# Patient Record
Sex: MALE | Race: WHITE | HISPANIC OR LATINO | Employment: FULL TIME | URBAN - METROPOLITAN AREA
[De-identification: names, ages, dates, MRNs, and addresses within clinical notes are randomized per-mention and may not be internally consistent; named-entity substitution may affect disease eponyms.]

---

## 2018-04-16 ENCOUNTER — HISTORICAL (OUTPATIENT)
Dept: RADIOLOGY | Facility: HOSPITAL | Age: 38
End: 2018-04-16

## 2018-04-16 LAB — POC CREATININE: 0.8 MG/DL (ref 0.6–1.3)

## 2018-04-18 ENCOUNTER — HISTORICAL (OUTPATIENT)
Dept: INTENSIVE CARE | Facility: HOSPITAL | Age: 38
End: 2018-04-18

## 2018-07-20 ENCOUNTER — HISTORICAL (OUTPATIENT)
Dept: ADMINISTRATIVE | Facility: HOSPITAL | Age: 38
End: 2018-07-20

## 2018-07-20 LAB
ABS NEUT (OLG): 6.96 X10(3)/MCL (ref 2.1–9.2)
ALBUMIN SERPL-MCNC: 4.2 GM/DL (ref 3.4–5)
ALBUMIN/GLOB SERPL: 1.1 {RATIO}
ALP SERPL-CCNC: 155 UNIT/L (ref 50–136)
ALT SERPL-CCNC: 36 UNIT/L (ref 12–78)
AST SERPL-CCNC: 29 UNIT/L (ref 15–37)
BASOPHILS # BLD AUTO: 0 X10(3)/MCL (ref 0–0.2)
BASOPHILS NFR BLD AUTO: 0 %
BILIRUB SERPL-MCNC: 0.4 MG/DL (ref 0.2–1)
BILIRUBIN DIRECT+TOT PNL SERPL-MCNC: 0.1 MG/DL (ref 0–0.2)
BILIRUBIN DIRECT+TOT PNL SERPL-MCNC: 0.3 MG/DL (ref 0–0.8)
BUN SERPL-MCNC: 10 MG/DL (ref 7–18)
CALCIUM SERPL-MCNC: 9.6 MG/DL (ref 8.5–10.1)
CHLORIDE SERPL-SCNC: 102 MMOL/L (ref 98–107)
CO2 SERPL-SCNC: 25 MMOL/L (ref 21–32)
CREAT SERPL-MCNC: 0.87 MG/DL (ref 0.7–1.3)
EOSINOPHIL # BLD AUTO: 0.1 X10(3)/MCL (ref 0–0.9)
EOSINOPHIL NFR BLD AUTO: 1 %
ERYTHROCYTE [DISTWIDTH] IN BLOOD BY AUTOMATED COUNT: 12.7 % (ref 11.5–17)
GLOBULIN SER-MCNC: 3.9 GM/DL (ref 2.4–3.5)
GLUCOSE SERPL-MCNC: 101 MG/DL (ref 74–106)
HCT VFR BLD AUTO: 43.7 % (ref 42–52)
HGB BLD-MCNC: 14.9 GM/DL (ref 14–18)
LYMPHOCYTES # BLD AUTO: 2.2 X10(3)/MCL (ref 0.6–4.6)
LYMPHOCYTES NFR BLD AUTO: 21 %
MCH RBC QN AUTO: 30.1 PG (ref 27–31)
MCHC RBC AUTO-ENTMCNC: 34.1 GM/DL (ref 33–36)
MCV RBC AUTO: 88.3 FL (ref 80–94)
MONOCYTES # BLD AUTO: 0.9 X10(3)/MCL (ref 0.1–1.3)
MONOCYTES NFR BLD AUTO: 8 %
NEUTROPHILS # BLD AUTO: 6.96 X10(3)/MCL (ref 2.1–9.2)
NEUTROPHILS NFR BLD AUTO: 68 %
PHENYTOIN SERPL-MCNC: 11.5 MCG/ML (ref 10–20)
PLATELET # BLD AUTO: 306 X10(3)/MCL (ref 130–400)
PMV BLD AUTO: 9.2 FL (ref 9.4–12.4)
POTASSIUM SERPL-SCNC: 4.1 MMOL/L (ref 3.5–5.1)
PROT SERPL-MCNC: 8.1 GM/DL (ref 6.4–8.2)
RBC # BLD AUTO: 4.95 X10(6)/MCL (ref 4.7–6.1)
SODIUM SERPL-SCNC: 138 MMOL/L (ref 136–145)
WBC # SPEC AUTO: 10.2 X10(3)/MCL (ref 4.5–11.5)

## 2018-11-13 ENCOUNTER — HISTORICAL (OUTPATIENT)
Dept: INTENSIVE CARE | Facility: HOSPITAL | Age: 38
End: 2018-11-13

## 2022-04-11 ENCOUNTER — HISTORICAL (OUTPATIENT)
Dept: ADMINISTRATIVE | Facility: HOSPITAL | Age: 42
End: 2022-04-11
Payer: COMMERCIAL

## 2022-04-24 VITALS
BODY MASS INDEX: 28 KG/M2 | WEIGHT: 164 LBS | HEIGHT: 64 IN | SYSTOLIC BLOOD PRESSURE: 138 MMHG | DIASTOLIC BLOOD PRESSURE: 92 MMHG

## 2022-09-13 RX ORDER — LAMOTRIGINE 200 MG/1
TABLET ORAL
COMMUNITY
Start: 2021-05-31 | End: 2022-10-17 | Stop reason: SDUPTHER

## 2022-09-13 RX ORDER — PHENYTOIN SODIUM 100 MG/1
CAPSULE, EXTENDED RELEASE ORAL
COMMUNITY
Start: 2022-02-09 | End: 2022-10-17 | Stop reason: SDUPTHER

## 2022-09-13 RX ORDER — ZONISAMIDE 100 MG/1
CAPSULE ORAL
COMMUNITY
Start: 2022-02-09 | End: 2022-10-17 | Stop reason: SDUPTHER

## 2022-10-17 ENCOUNTER — OFFICE VISIT (OUTPATIENT)
Dept: NEUROLOGY | Facility: CLINIC | Age: 42
End: 2022-10-17
Payer: COMMERCIAL

## 2022-10-17 VITALS
HEIGHT: 64 IN | SYSTOLIC BLOOD PRESSURE: 128 MMHG | DIASTOLIC BLOOD PRESSURE: 84 MMHG | BODY MASS INDEX: 29.02 KG/M2 | WEIGHT: 170 LBS

## 2022-10-17 DIAGNOSIS — G40.909 NONINTRACTABLE EPILEPSY WITHOUT STATUS EPILEPTICUS, UNSPECIFIED EPILEPSY TYPE: Primary | ICD-10-CM

## 2022-10-17 PROCEDURE — 3079F DIAST BP 80-89 MM HG: CPT | Mod: CPTII,S$GLB,, | Performed by: NURSE PRACTITIONER

## 2022-10-17 PROCEDURE — 1159F MED LIST DOCD IN RCRD: CPT | Mod: CPTII,S$GLB,, | Performed by: NURSE PRACTITIONER

## 2022-10-17 PROCEDURE — 99213 PR OFFICE/OUTPT VISIT, EST, LEVL III, 20-29 MIN: ICD-10-PCS | Mod: S$GLB,,, | Performed by: NURSE PRACTITIONER

## 2022-10-17 PROCEDURE — 4010F ACE/ARB THERAPY RXD/TAKEN: CPT | Mod: CPTII,S$GLB,, | Performed by: NURSE PRACTITIONER

## 2022-10-17 PROCEDURE — 99999 PR PBB SHADOW E&M-EST. PATIENT-LVL III: ICD-10-PCS | Mod: PBBFAC,,, | Performed by: NURSE PRACTITIONER

## 2022-10-17 PROCEDURE — 99999 PR PBB SHADOW E&M-EST. PATIENT-LVL III: CPT | Mod: PBBFAC,,, | Performed by: NURSE PRACTITIONER

## 2022-10-17 PROCEDURE — 1159F PR MEDICATION LIST DOCUMENTED IN MEDICAL RECORD: ICD-10-PCS | Mod: CPTII,S$GLB,, | Performed by: NURSE PRACTITIONER

## 2022-10-17 PROCEDURE — 4010F PR ACE/ARB THEARPY RXD/TAKEN: ICD-10-PCS | Mod: CPTII,S$GLB,, | Performed by: NURSE PRACTITIONER

## 2022-10-17 PROCEDURE — 3074F SYST BP LT 130 MM HG: CPT | Mod: CPTII,S$GLB,, | Performed by: NURSE PRACTITIONER

## 2022-10-17 PROCEDURE — 99213 OFFICE O/P EST LOW 20 MIN: CPT | Mod: S$GLB,,, | Performed by: NURSE PRACTITIONER

## 2022-10-17 PROCEDURE — 3079F PR MOST RECENT DIASTOLIC BLOOD PRESSURE 80-89 MM HG: ICD-10-PCS | Mod: CPTII,S$GLB,, | Performed by: NURSE PRACTITIONER

## 2022-10-17 PROCEDURE — 3074F PR MOST RECENT SYSTOLIC BLOOD PRESSURE < 130 MM HG: ICD-10-PCS | Mod: CPTII,S$GLB,, | Performed by: NURSE PRACTITIONER

## 2022-10-17 RX ORDER — LOSARTAN POTASSIUM 100 MG/1
100 TABLET ORAL DAILY
COMMUNITY
Start: 2022-09-13

## 2022-10-17 RX ORDER — AMLODIPINE BESYLATE 5 MG/1
5 TABLET ORAL DAILY
COMMUNITY
Start: 2022-09-15

## 2022-10-17 RX ORDER — LAMOTRIGINE 200 MG/1
400 TABLET ORAL 2 TIMES DAILY
Qty: 360 TABLET | Refills: 3 | Status: SHIPPED | OUTPATIENT
Start: 2022-10-17 | End: 2023-12-06 | Stop reason: SDUPTHER

## 2022-10-17 RX ORDER — ZONISAMIDE 100 MG/1
200 CAPSULE ORAL NIGHTLY
Qty: 180 CAPSULE | Refills: 3 | Status: SHIPPED | OUTPATIENT
Start: 2022-10-17 | End: 2022-11-29

## 2022-10-17 RX ORDER — PHENYTOIN SODIUM 100 MG/1
200 CAPSULE, EXTENDED RELEASE ORAL 2 TIMES DAILY
Qty: 360 CAPSULE | Refills: 3 | Status: SHIPPED | OUTPATIENT
Start: 2022-10-17 | End: 2023-11-02 | Stop reason: SDUPTHER

## 2022-10-17 NOTE — PROGRESS NOTES
"SEIZURE FOLLOW UP    SUBJECTIVE:  Patient ID: Kenny Lou   41 y.o.  Chief Complaint: Seizures (1 year f/u for seizures. Patient reports no seizure activity since last visit. States his last seizure was 4-5 years ago appx.)      History of Present Illness:     Presents today for seizure follow up. Denies seizure- like activity since last visit. Last known seizure was in 2019    Continues to take lamotrigine 400mg BID, phenytoin 200mg BID, and zonisamide 200mg qhs; tolerating meds w/o diff.      Continues to having ringing in ears aura. No seizures following    Sleeps well at night.      Review of Systems - as per HPI, otherwise a balanced 10 systems review is negative.    PFSH: Past medical, family, and social history reviewed as documented in chart with pertinent positive medical, family, and social history detailed in HPI.    Current Medications:    Current Outpatient Medications:     amLODIPine (NORVASC) 5 MG tablet, Take 5 mg by mouth once daily., Disp: , Rfl:     losartan (COZAAR) 100 MG tablet, Take 100 mg by mouth once daily., Disp: , Rfl:     lamoTRIgine (LAMICTAL) 200 MG tablet, Take 2 tablets (400 mg total) by mouth 2 (two) times daily., Disp: 360 tablet, Rfl: 3    phenytoin (DILANTIN) 100 MG ER capsule, Take 2 capsules (200 mg total) by mouth 2 (two) times daily., Disp: 360 capsule, Rfl: 3    zonisamide (ZONEGRAN) 100 MG Cap, Take 2 capsules (200 mg total) by mouth every evening., Disp: 180 capsule, Rfl: 3      OBJECTIVE:  Vitals:  /84 (BP Location: Left arm, Patient Position: Sitting)   Ht 5' 4" (1.626 m)   Wt 77.1 kg (170 lb)   BMI 29.18 kg/m²     Physical Exam:  Constitutional: he appears well-developed and well-nourished. he is well groomed   Head: Normocephalic and atraumatic  Musculoskeletal: Normal range of motion.   Skin: Skin is warm and dry.  Psychiatric: Normal mood and affect.     Neuro exam:    The patient is alert and oriented   Normal attention and concentration  Speech " is normal   Pupils are equal and reactive to light.    Visual fields are full to confrontation testing.   CN 8 - hearing is grossly normal  Motor - grossly normal  Gait - unassisted; posture upright. gait is steady with normal steps    Review of Data:   Notes reviewed   Labs:  No visits with results within 3 Month(s) from this visit.   Latest known visit with results is:   Historical on 07/20/2018   Component Date Value Ref Range Status    Albumin/Globulin Ratio 07/20/2018 1.1   Final    Alanine Aminotransferase 07/20/2018 36  12 - 78 unit/L Final    Albumin Level 07/20/2018 4.20  3.40 - 5.00 gm/dL Final    Aspartate Aminotransferase 07/20/2018 29  15 - 37 unit/L Final    Alkaline Phosphatase 07/20/2018 155 (H)  50 - 136 unit/L Final    Bilirubin Total 07/20/2018 0.4  0.2 - 1.0 mg/dL Final    Bilirubin Direct 07/20/2018 0.10  0.00 - 0.20 mg/dL Final    Bilirubin Indirect 07/20/2018 0.30  0.00 - 0.80 mg/dL Final    Blood Urea Nitrogen 07/20/2018 10.0  7.0 - 18.0 mg/dL Final    Chloride 07/20/2018 102  98 - 107 mmol/L Final    Calcium Level Total 07/20/2018 9.6  8.5 - 10.1 mg/dL Final    Carbon Dioxide 07/20/2018 25.0  21.0 - 32.0 mmol/L Final    Creatinine 07/20/2018 0.87  0.70 - 1.30 mg/dL Final    Globulin 07/20/2018 3.90 (H)  2.40 - 3.50 gm/dL Final    Glucose Level 07/20/2018 101  74 - 106 mg/dL Final    Potassium Level 07/20/2018 4.1  3.5 - 5.1 mmol/L Final    Sodium Level 07/20/2018 138  136 - 145 mmol/L Final    Protein Total 07/20/2018 8.1  6.4 - 8.2 gm/dL Final    Estimated GFR- 07/20/2018 >60  mL/min/1.73 m2 Final    Estimated GFR-Non  07/20/2018 >60  mL/min/1.73 m2 Final    Abs Neut 07/20/2018 6.96  2.10 - 9.20 x10(3)/mcL Final    MCH 07/20/2018 30.1  27.0 - 31.0 pg Final    Hct 07/20/2018 43.7  42.0 - 52.0 % Final    MCHC 07/20/2018 34.1  33.0 - 36.0 gm/dL Final    MCV 07/20/2018 88.3  80.0 - 94.0 fL Final    Hgb 07/20/2018 14.9  14.0 - 18.0 gm/dL Final    MPV 07/20/2018  9.2 (L)  9.4 - 12.4 fL Final    RDW 07/20/2018 12.7  11.5 - 17.0 % Final    RBC 07/20/2018 4.95  4.70 - 6.10 x10(6)/mcL Final    Platelet 07/20/2018 306  130 - 400 x10(3)/mcL Final    WBC 07/20/2018 10.2  4.5 - 11.5 x10(3)/mcL Final    Eos # 07/20/2018 0.1  0.0 - 0.9 x10(3)/mcL Final    Mono # 07/20/2018 0.9  0.1 - 1.3 x10(3)/mcL Final    Baso # 07/20/2018 0.0  0.0 - 0.2 x10(3)/mcL Final    Basophil % 07/20/2018 0  % Final    Lymph # 07/20/2018 2.2  0.6 - 4.6 x10(3)/mcL Final    Neut # 07/20/2018 6.96  2.10 - 9.20 x10(3)/mcL Final    Eos % 07/20/2018 1  % Final    Neut % 07/20/2018 68  % Final    Mono % 07/20/2018 8  % Final    Lymph % 07/20/2018 21  % Final    Phenytoin Level Total 07/20/2018 11.5  10.0 - 20.0 mcg/mL Final     Imaging:  Results for orders placed or performed in visit on 04/16/18   MRI Brain W WO Contrast    Narrative    MRI Brain W W/O Contrast     REASON FOR STUDY: seizure/arachnoid cyst;Other (please specify) 37  years Male      COMPARISON: CT 12/6/2017     TECHNIQUE: Multiplanar imaging was performed through the cranial  region using T1, T2, FLAIR, T2 gradient echo, diffusion and ADC map  sequences.  Study was done with and without contrast.        FINDINGS:     There is a CSF isointense extra-axial fluid collection in the anterior  medial left middle fossa measuring 2 cm x 1 cm. This is unchanged  since the previous CT. There is no enhancement. No other extra-axial  fluid collection or mass is visualized. Ventricles are normal in size,  shape and position. There is no abnormal intra-axial signal intensity.  There is no restricted diffusion. The craniovertebral junction and  sella turcica are normal.     Impression:     Findings in the left middle fossa are consistent with a small  arachnoid cyst.        Electronically Signed By: Isaac Pearl MD  Date/Time Signed: 04/16/2018 10:21       11/2018- Routine EEG was nml   prior EEG in 04/2018 was nml             ASSESSMENT/ PLAN:  Problem List  Items Addressed This Visit          Neuro    Nonintractable epilepsy without status epilepticus - Primary    Current Assessment & Plan     - Continue lamotrigine 400mg BID, phenytoin 200mg BID, and zonisamide 200mg qhs        Avoid medications such as Tramadol, Wellbutrin, Cipro, Levaquin     Avoid ladders/heights     Avoid binge drinking     Avoid sleep deprivation      - RTC in 1 yr                        Questions and concerns were sought and answered to the patient's stated verbal satisfaction.    The patient verbalizes understanding and agreement with the above stated treatment plan.   Dr. Oakley was available during today's encounter.     Items discussed include acute and/or chronic neurological, sleep, or other issues and their attendant differential diagnoses.  Potential for additional testing, treatment options, and prognosis also discussed.    _*_single dx ___multiple issues/ diagnoses  __ low _*_mod ___ high complexity of data  _*_low __mod ___ high risks     Medical Decision Making (MDM) used for CPT choice:  _*__low  ___moderate  ____high          NAUN Saldivar  [facility]  [phone]

## 2022-10-17 NOTE — ASSESSMENT & PLAN NOTE
- Continue lamotrigine 400mg BID, phenytoin 200mg BID, and zonisamide 200mg qhs        Avoid medications such as Tramadol, Wellbutrin, Cipro, Levaquin     Avoid ladders/heights     Avoid binge drinking     Avoid sleep deprivation      - RTC in 1 yr

## 2023-10-17 ENCOUNTER — OFFICE VISIT (OUTPATIENT)
Dept: NEUROLOGY | Facility: CLINIC | Age: 43
End: 2023-10-17
Payer: COMMERCIAL

## 2023-10-17 VITALS
DIASTOLIC BLOOD PRESSURE: 86 MMHG | SYSTOLIC BLOOD PRESSURE: 114 MMHG | HEIGHT: 64 IN | WEIGHT: 180 LBS | BODY MASS INDEX: 30.73 KG/M2

## 2023-10-17 DIAGNOSIS — G40.909 NONINTRACTABLE EPILEPSY WITHOUT STATUS EPILEPTICUS, UNSPECIFIED EPILEPSY TYPE: Primary | ICD-10-CM

## 2023-10-17 PROCEDURE — 3074F SYST BP LT 130 MM HG: CPT | Mod: CPTII,S$GLB,, | Performed by: NURSE PRACTITIONER

## 2023-10-17 PROCEDURE — 3079F PR MOST RECENT DIASTOLIC BLOOD PRESSURE 80-89 MM HG: ICD-10-PCS | Mod: CPTII,S$GLB,, | Performed by: NURSE PRACTITIONER

## 2023-10-17 PROCEDURE — 99213 OFFICE O/P EST LOW 20 MIN: CPT | Mod: S$GLB,,, | Performed by: NURSE PRACTITIONER

## 2023-10-17 PROCEDURE — 99999 PR PBB SHADOW E&M-EST. PATIENT-LVL III: CPT | Mod: PBBFAC,,, | Performed by: NURSE PRACTITIONER

## 2023-10-17 PROCEDURE — 1159F PR MEDICATION LIST DOCUMENTED IN MEDICAL RECORD: ICD-10-PCS | Mod: CPTII,S$GLB,, | Performed by: NURSE PRACTITIONER

## 2023-10-17 PROCEDURE — 99213 PR OFFICE/OUTPT VISIT, EST, LEVL III, 20-29 MIN: ICD-10-PCS | Mod: S$GLB,,, | Performed by: NURSE PRACTITIONER

## 2023-10-17 PROCEDURE — 99999 PR PBB SHADOW E&M-EST. PATIENT-LVL III: ICD-10-PCS | Mod: PBBFAC,,, | Performed by: NURSE PRACTITIONER

## 2023-10-17 PROCEDURE — 3079F DIAST BP 80-89 MM HG: CPT | Mod: CPTII,S$GLB,, | Performed by: NURSE PRACTITIONER

## 2023-10-17 PROCEDURE — 3008F BODY MASS INDEX DOCD: CPT | Mod: CPTII,S$GLB,, | Performed by: NURSE PRACTITIONER

## 2023-10-17 PROCEDURE — 1159F MED LIST DOCD IN RCRD: CPT | Mod: CPTII,S$GLB,, | Performed by: NURSE PRACTITIONER

## 2023-10-17 PROCEDURE — 3074F PR MOST RECENT SYSTOLIC BLOOD PRESSURE < 130 MM HG: ICD-10-PCS | Mod: CPTII,S$GLB,, | Performed by: NURSE PRACTITIONER

## 2023-10-17 PROCEDURE — 3008F PR BODY MASS INDEX (BMI) DOCUMENTED: ICD-10-PCS | Mod: CPTII,S$GLB,, | Performed by: NURSE PRACTITIONER

## 2023-10-17 NOTE — PROGRESS NOTES
"  SEIZURE FOLLOW UP    SUBJECTIVE:  Patient ID: Kenny Lou   42 y.o.  Chief Complaint: Nonintractable epilepsy without status epilepticus, unspeci (1 year follow up/Denies any seizures, but has auras every now and then)      History of Present Illness:     Presents today for seizure follow up.     Denies any seizures, but "has auras every now and then." Continues to having ringing in ears aura. Last known seizure was in 2019    Continues to take lamotrigine 400mg BID, phenytoin 200mg BID, and zonisamide 200mg qhs; tolerating meds w/o diff.      Sleeps at night.    Had recent lab work with PCP      Review of Systems - as per HPI, otherwise a balanced 10 systems review is negative.    PFSH: Past medical, family, and social history reviewed as documented in chart     Current Medications:  Current Outpatient Medications   Medication Instructions    amLODIPine (NORVASC) 5 mg, Oral, Daily    lamoTRIgine (LAMICTAL) 400 mg, Oral, 2 times daily    losartan (COZAAR) 100 mg, Oral, Daily    phenytoin (DILANTIN) 200 mg, Oral, 2 times daily    zonisamide (ZONEGRAN) 100 MG Cap TAKE 2 CAPSULES BY MOUTH AT BEDTIME         OBJECTIVE:  Vitals:  /86   Ht 5' 4" (1.626 m)   Wt 81.6 kg (180 lb)   BMI 30.90 kg/m²     Physical Exam:  Constitutional: he appears well-developed and well-nourished. he is well groomed.    Head: Normocephalic and atraumatic  Resp: Respiratory effort is normal.   Cardiac: RRR  Musculoskeletal: Normal range of motion.   Skin: Skin is warm and dry.  Psychiatric: Normal mood and affect.     Neuro exam:    The patient is alert and oriented   Normal attention and concentration  Speech is normal   CN 8 - hearing is grossly normal  Motor - grossly normal  Gait - unassisted; posture upright. gait is steady with normal steps    Review of Data:   Notes  reviewed      Imaging:  Results for orders placed or performed in visit on 04/16/18   MRI Brain W WO Contrast    Narrative    MRI Brain W W/O Contrast   "   REASON FOR STUDY: seizure/arachnoid cyst;Other (please specify) 37  years Male      COMPARISON: CT 12/6/2017     TECHNIQUE: Multiplanar imaging was performed through the cranial  region using T1, T2, FLAIR, T2 gradient echo, diffusion and ADC map  sequences.  Study was done with and without contrast.        FINDINGS:     There is a CSF isointense extra-axial fluid collection in the anterior  medial left middle fossa measuring 2 cm x 1 cm. This is unchanged  since the previous CT. There is no enhancement. No other extra-axial  fluid collection or mass is visualized. Ventricles are normal in size,  shape and position. There is no abnormal intra-axial signal intensity.  There is no restricted diffusion. The craniovertebral junction and  sella turcica are normal.     Impression:     Findings in the left middle fossa are consistent with a small  arachnoid cyst.        Electronically Signed By: Isaac Pearl MD  Date/Time Signed: 04/16/2018 10:21            ASSESSMENT /PLAN:    Problem List Items Addressed This Visit          Neuro    Nonintractable epilepsy without status epilepticus - Primary    Continue lamotrigine 400mg BID, phenytoin 200mg BID, and zonisamide 200mg qhs         Avoid medications such as Tramadol, Wellbutrin, Cipro, Levaquin     Avoid ladders/heights     Avoid binge drinking     Avoid sleep deprivation      - RTC in 1 yr            Questions and concerns were sought and answered to the patient's stated verbal satisfaction.    The patient verbalizes understanding and agreement with the above stated treatment plan.     Items discussed include acute and/or chronic neurological, sleep, or other issues and their attendant differential diagnoses.  Potential for additional testing, treatment options, and prognosis also discussed.    __*single dx ___multiple issues/ diagnoses  __ low _*_mod ___ high complexity of data  _*_low __mod ___ high risks     Medical Decision Making (MDM) used for CPT  choice:  _*__low  ___moderate  ____high          NAUN Saldivar  [facility]  [phone]

## 2023-11-02 DIAGNOSIS — G40.909 NONINTRACTABLE EPILEPSY WITHOUT STATUS EPILEPTICUS, UNSPECIFIED EPILEPSY TYPE: ICD-10-CM

## 2023-11-02 RX ORDER — PHENYTOIN SODIUM 100 MG/1
200 CAPSULE, EXTENDED RELEASE ORAL 2 TIMES DAILY
Qty: 360 CAPSULE | Refills: 3 | Status: SHIPPED | OUTPATIENT
Start: 2023-11-02 | End: 2024-10-27

## 2023-11-02 NOTE — TELEPHONE ENCOUNTER
Medication: Dilantin 100 mg ER capsules    Pharmacy:   [id number] - [place], [place] - [address] AT [facility] [address]  [address]  [place] [address]  Phone: [phone] Fax: [phone]       Last Appointment: 10/17/2023    Next Appointment: 10/29/2024    Call back number: [phone]

## 2023-12-06 DIAGNOSIS — G40.909 NONINTRACTABLE EPILEPSY WITHOUT STATUS EPILEPTICUS, UNSPECIFIED EPILEPSY TYPE: ICD-10-CM

## 2023-12-06 RX ORDER — LAMOTRIGINE 200 MG/1
400 TABLET ORAL 2 TIMES DAILY
Qty: 360 TABLET | Refills: 3 | Status: SHIPPED | OUTPATIENT
Start: 2023-12-06 | End: 2024-12-05

## 2024-02-22 DIAGNOSIS — G40.909 NONINTRACTABLE EPILEPSY WITHOUT STATUS EPILEPTICUS, UNSPECIFIED EPILEPSY TYPE: ICD-10-CM

## 2024-02-22 RX ORDER — ZONISAMIDE 100 MG/1
CAPSULE ORAL
Qty: 180 CAPSULE | Refills: 3 | Status: SHIPPED | OUTPATIENT
Start: 2024-02-22

## 2024-10-26 DIAGNOSIS — G40.909 NONINTRACTABLE EPILEPSY WITHOUT STATUS EPILEPTICUS, UNSPECIFIED EPILEPSY TYPE: ICD-10-CM

## 2024-10-28 RX ORDER — PHENYTOIN SODIUM 100 MG/1
CAPSULE, EXTENDED RELEASE ORAL
Qty: 360 CAPSULE | Refills: 3 | Status: SHIPPED | OUTPATIENT
Start: 2024-10-28

## 2024-10-29 ENCOUNTER — OFFICE VISIT (OUTPATIENT)
Dept: NEUROLOGY | Facility: CLINIC | Age: 44
End: 2024-10-29
Payer: COMMERCIAL

## 2024-10-29 VITALS
HEIGHT: 64 IN | WEIGHT: 180 LBS | SYSTOLIC BLOOD PRESSURE: 138 MMHG | DIASTOLIC BLOOD PRESSURE: 84 MMHG | BODY MASS INDEX: 30.73 KG/M2

## 2024-10-29 DIAGNOSIS — G40.409 OTHER GENERALIZED EPILEPSY, NOT INTRACTABLE, WITHOUT STATUS EPILEPTICUS: Primary | ICD-10-CM

## 2024-10-29 PROCEDURE — 4010F ACE/ARB THERAPY RXD/TAKEN: CPT | Mod: CPTII,S$GLB,, | Performed by: NURSE PRACTITIONER

## 2024-10-29 PROCEDURE — 99999 PR PBB SHADOW E&M-EST. PATIENT-LVL III: CPT | Mod: PBBFAC,,, | Performed by: NURSE PRACTITIONER

## 2024-10-29 PROCEDURE — 3075F SYST BP GE 130 - 139MM HG: CPT | Mod: CPTII,S$GLB,, | Performed by: NURSE PRACTITIONER

## 2024-10-29 PROCEDURE — 99213 OFFICE O/P EST LOW 20 MIN: CPT | Mod: S$GLB,,, | Performed by: NURSE PRACTITIONER

## 2024-10-29 PROCEDURE — 3008F BODY MASS INDEX DOCD: CPT | Mod: CPTII,S$GLB,, | Performed by: NURSE PRACTITIONER

## 2024-10-29 PROCEDURE — 3079F DIAST BP 80-89 MM HG: CPT | Mod: CPTII,S$GLB,, | Performed by: NURSE PRACTITIONER

## 2024-12-06 ENCOUNTER — TELEPHONE (OUTPATIENT)
Dept: NEUROLOGY | Facility: CLINIC | Age: 44
End: 2024-12-06
Payer: COMMERCIAL

## 2024-12-06 DIAGNOSIS — G40.909 NONINTRACTABLE EPILEPSY WITHOUT STATUS EPILEPTICUS, UNSPECIFIED EPILEPSY TYPE: ICD-10-CM

## 2024-12-06 DIAGNOSIS — G40.909 NONINTRACTABLE EPILEPSY WITHOUT STATUS EPILEPTICUS, UNSPECIFIED EPILEPSY TYPE: Primary | ICD-10-CM

## 2024-12-06 RX ORDER — LAMOTRIGINE 200 MG/1
400 TABLET ORAL 2 TIMES DAILY
Qty: 360 TABLET | Refills: 3 | Status: SHIPPED | OUTPATIENT
Start: 2024-12-06 | End: 2025-12-06

## 2024-12-06 NOTE — TELEPHONE ENCOUNTER
Call received from patient stating he  took his last dose of Lamictal  400 mg last night . Pt. Saw Ivette DEWITT NP at last visit, but needs to fill medication so he won't miss a dose. Can you please call into [facility] pharmacy  on [address] @ [place].

## 2025-02-20 DIAGNOSIS — G40.909 NONINTRACTABLE EPILEPSY WITHOUT STATUS EPILEPTICUS, UNSPECIFIED EPILEPSY TYPE: ICD-10-CM

## 2025-02-20 RX ORDER — ZONISAMIDE 100 MG/1
200 CAPSULE ORAL NIGHTLY
Qty: 180 CAPSULE | Refills: 3 | Status: SHIPPED | OUTPATIENT
Start: 2025-02-20

## 2025-05-30 ENCOUNTER — TELEPHONE (OUTPATIENT)
Dept: NEUROLOGY | Facility: CLINIC | Age: 45
End: 2025-05-30

## 2025-05-30 DIAGNOSIS — G40.909 NONINTRACTABLE EPILEPSY WITHOUT STATUS EPILEPTICUS, UNSPECIFIED EPILEPSY TYPE: Primary | ICD-10-CM

## 2025-06-02 ENCOUNTER — LAB VISIT (OUTPATIENT)
Dept: LAB | Facility: HOSPITAL | Age: 45
End: 2025-06-02
Payer: COMMERCIAL

## 2025-06-02 DIAGNOSIS — G40.909 NONINTRACTABLE EPILEPSY WITHOUT STATUS EPILEPTICUS, UNSPECIFIED EPILEPSY TYPE: ICD-10-CM

## 2025-06-02 LAB
ALBUMIN SERPL-MCNC: 4.2 G/DL (ref 3.5–5)
ALBUMIN/GLOB SERPL: 1.1 RATIO (ref 1.1–2)
ALP SERPL-CCNC: 103 UNIT/L (ref 40–150)
ALT SERPL-CCNC: 23 UNIT/L (ref 0–55)
ANION GAP SERPL CALC-SCNC: 9 MEQ/L
AST SERPL-CCNC: 19 UNIT/L (ref 11–45)
BASOPHILS # BLD AUTO: 0.03 X10(3)/MCL
BASOPHILS NFR BLD AUTO: 0.5 %
BILIRUB SERPL-MCNC: 0.2 MG/DL
BUN SERPL-MCNC: 14.1 MG/DL (ref 8.9–20.6)
CALCIUM SERPL-MCNC: 9.2 MG/DL (ref 8.4–10.2)
CHLORIDE SERPL-SCNC: 110 MMOL/L (ref 98–107)
CO2 SERPL-SCNC: 20 MMOL/L (ref 22–29)
CREAT SERPL-MCNC: 1.08 MG/DL (ref 0.72–1.25)
CREAT/UREA NIT SERPL: 13
EOSINOPHIL # BLD AUTO: 0.09 X10(3)/MCL (ref 0–0.9)
EOSINOPHIL NFR BLD AUTO: 1.5 %
ERYTHROCYTE [DISTWIDTH] IN BLOOD BY AUTOMATED COUNT: 12.5 % (ref 11.5–17)
GFR SERPLBLD CREATININE-BSD FMLA CKD-EPI: >60 ML/MIN/1.73/M2
GLOBULIN SER-MCNC: 3.7 GM/DL (ref 2.4–3.5)
GLUCOSE SERPL-MCNC: 93 MG/DL (ref 74–100)
HCT VFR BLD AUTO: 46.4 % (ref 42–52)
HGB BLD-MCNC: 15.7 G/DL (ref 14–18)
IMM GRANULOCYTES # BLD AUTO: 0.01 X10(3)/MCL (ref 0–0.04)
IMM GRANULOCYTES NFR BLD AUTO: 0.2 %
LYMPHOCYTES # BLD AUTO: 1.82 X10(3)/MCL (ref 0.6–4.6)
LYMPHOCYTES NFR BLD AUTO: 30.4 %
MCH RBC QN AUTO: 30.1 PG (ref 27–31)
MCHC RBC AUTO-ENTMCNC: 33.8 G/DL (ref 33–36)
MCV RBC AUTO: 89.1 FL (ref 80–94)
MONOCYTES # BLD AUTO: 0.34 X10(3)/MCL (ref 0.1–1.3)
MONOCYTES NFR BLD AUTO: 5.7 %
NEUTROPHILS # BLD AUTO: 3.7 X10(3)/MCL (ref 2.1–9.2)
NEUTROPHILS NFR BLD AUTO: 61.7 %
NRBC BLD AUTO-RTO: 0 %
PHENYTOIN SERPL-MCNC: 21 UG/ML (ref 10–20)
PLATELET # BLD AUTO: 297 X10(3)/MCL (ref 130–400)
PMV BLD AUTO: 9.5 FL (ref 7.4–10.4)
POTASSIUM SERPL-SCNC: 4.3 MMOL/L (ref 3.5–5.1)
PROT SERPL-MCNC: 7.9 GM/DL (ref 6.4–8.3)
RBC # BLD AUTO: 5.21 X10(6)/MCL (ref 4.7–6.1)
SODIUM SERPL-SCNC: 139 MMOL/L (ref 136–145)
WBC # BLD AUTO: 5.99 X10(3)/MCL (ref 4.5–11.5)

## 2025-06-02 PROCEDURE — 80185 ASSAY OF PHENYTOIN TOTAL: CPT

## 2025-06-02 PROCEDURE — 80203 DRUG SCREEN QUANT ZONISAMIDE: CPT

## 2025-06-02 PROCEDURE — 80053 COMPREHEN METABOLIC PANEL: CPT

## 2025-06-02 PROCEDURE — 36415 COLL VENOUS BLD VENIPUNCTURE: CPT

## 2025-06-02 PROCEDURE — 85025 COMPLETE CBC W/AUTO DIFF WBC: CPT

## 2025-06-02 PROCEDURE — 80175 DRUG SCREEN QUAN LAMOTRIGINE: CPT

## 2025-06-03 LAB
LAMOTRIGINE SERPL-MCNC: 12 MCG/ML (ref 3–15)
ZONISAMIDE SERPL-MCNC: 7.7 MCG/ML (ref 10–40)

## 2025-06-09 ENCOUNTER — RESULTS FOLLOW-UP (OUTPATIENT)
Dept: NEUROLOGY | Facility: CLINIC | Age: 45
End: 2025-06-09